# Patient Record
Sex: FEMALE | Race: WHITE | NOT HISPANIC OR LATINO
[De-identification: names, ages, dates, MRNs, and addresses within clinical notes are randomized per-mention and may not be internally consistent; named-entity substitution may affect disease eponyms.]

---

## 2020-10-23 ENCOUNTER — TRANSCRIPTION ENCOUNTER (OUTPATIENT)
Age: 20
End: 2020-10-23

## 2021-02-09 ENCOUNTER — TRANSCRIPTION ENCOUNTER (OUTPATIENT)
Age: 21
End: 2021-02-09

## 2021-09-05 ENCOUNTER — TRANSCRIPTION ENCOUNTER (OUTPATIENT)
Age: 21
End: 2021-09-05

## 2021-10-30 ENCOUNTER — TRANSCRIPTION ENCOUNTER (OUTPATIENT)
Age: 21
End: 2021-10-30

## 2022-11-01 ENCOUNTER — NON-APPOINTMENT (OUTPATIENT)
Age: 22
End: 2022-11-01

## 2023-03-24 PROBLEM — Z00.00 ENCOUNTER FOR PREVENTIVE HEALTH EXAMINATION: Status: ACTIVE | Noted: 2023-03-24

## 2023-03-27 ENCOUNTER — APPOINTMENT (OUTPATIENT)
Dept: MRI IMAGING | Facility: HOSPITAL | Age: 23
End: 2023-03-27

## 2023-09-17 ENCOUNTER — NON-APPOINTMENT (OUTPATIENT)
Age: 23
End: 2023-09-17

## 2023-09-22 ENCOUNTER — NON-APPOINTMENT (OUTPATIENT)
Age: 23
End: 2023-09-22

## 2023-09-24 ENCOUNTER — EMERGENCY (EMERGENCY)
Facility: HOSPITAL | Age: 23
LOS: 1 days | Discharge: HOME | End: 2023-09-24
Attending: STUDENT IN AN ORGANIZED HEALTH CARE EDUCATION/TRAINING PROGRAM
Payer: COMMERCIAL

## 2023-09-24 VITALS
RESPIRATION RATE: 20 BRPM | HEART RATE: 110 BPM | DIASTOLIC BLOOD PRESSURE: 82 MMHG | WEIGHT: 139.99 LBS | OXYGEN SATURATION: 99 % | TEMPERATURE: 99 F | SYSTOLIC BLOOD PRESSURE: 118 MMHG | HEIGHT: 65 IN

## 2023-09-24 LAB
ALBUMIN SERPL ELPH-MCNC: 4 G/DL — SIGNIFICANT CHANGE UP (ref 3.3–5)
ALP SERPL-CCNC: 73 U/L — SIGNIFICANT CHANGE UP (ref 40–120)
ALT FLD-CCNC: 13 U/L — SIGNIFICANT CHANGE UP (ref 10–45)
ANION GAP SERPL CALC-SCNC: 13 MMOL/L — SIGNIFICANT CHANGE UP (ref 5–17)
ANION GAP SERPL CALC-SCNC: 14 MMOL/L — SIGNIFICANT CHANGE UP (ref 5–17)
APTT BLD: 26.1 SEC — SIGNIFICANT CHANGE UP (ref 24.5–35.6)
AST SERPL-CCNC: 50 U/L — HIGH (ref 10–40)
BASOPHILS # BLD AUTO: 0.04 K/UL — SIGNIFICANT CHANGE UP (ref 0–0.2)
BASOPHILS NFR BLD AUTO: 0.3 % — SIGNIFICANT CHANGE UP (ref 0–2)
BILIRUB SERPL-MCNC: 0.5 MG/DL — SIGNIFICANT CHANGE UP (ref 0.2–1.2)
BUN SERPL-MCNC: 10 MG/DL — SIGNIFICANT CHANGE UP (ref 7–23)
BUN SERPL-MCNC: 9 MG/DL — SIGNIFICANT CHANGE UP (ref 7–23)
CALCIUM SERPL-MCNC: 8.6 MG/DL — SIGNIFICANT CHANGE UP (ref 8.4–10.5)
CALCIUM SERPL-MCNC: 9.4 MG/DL — SIGNIFICANT CHANGE UP (ref 8.4–10.5)
CHLORIDE SERPL-SCNC: 102 MMOL/L — SIGNIFICANT CHANGE UP (ref 96–108)
CHLORIDE SERPL-SCNC: 99 MMOL/L — SIGNIFICANT CHANGE UP (ref 96–108)
CO2 SERPL-SCNC: 22 MMOL/L — SIGNIFICANT CHANGE UP (ref 22–31)
CO2 SERPL-SCNC: 22 MMOL/L — SIGNIFICANT CHANGE UP (ref 22–31)
CREAT SERPL-MCNC: 0.58 MG/DL — SIGNIFICANT CHANGE UP (ref 0.5–1.3)
CREAT SERPL-MCNC: 0.62 MG/DL — SIGNIFICANT CHANGE UP (ref 0.5–1.3)
EGFR: 127 ML/MIN/1.73M2 — SIGNIFICANT CHANGE UP
EGFR: 129 ML/MIN/1.73M2 — SIGNIFICANT CHANGE UP
EOSINOPHIL # BLD AUTO: 0.02 K/UL — SIGNIFICANT CHANGE UP (ref 0–0.5)
EOSINOPHIL NFR BLD AUTO: 0.2 % — SIGNIFICANT CHANGE UP (ref 0–6)
GLUCOSE SERPL-MCNC: 86 MG/DL — SIGNIFICANT CHANGE UP (ref 70–99)
GLUCOSE SERPL-MCNC: 86 MG/DL — SIGNIFICANT CHANGE UP (ref 70–99)
HCT VFR BLD CALC: 39 % — SIGNIFICANT CHANGE UP (ref 34.5–45)
HGB BLD-MCNC: 13.3 G/DL — SIGNIFICANT CHANGE UP (ref 11.5–15.5)
IMM GRANULOCYTES NFR BLD AUTO: 1.9 % — HIGH (ref 0–0.9)
INR BLD: 1.13 RATIO — SIGNIFICANT CHANGE UP (ref 0.85–1.18)
LYMPHOCYTES # BLD AUTO: 1.6 K/UL — SIGNIFICANT CHANGE UP (ref 1–3.3)
LYMPHOCYTES # BLD AUTO: 12.5 % — LOW (ref 13–44)
MCHC RBC-ENTMCNC: 30 PG — SIGNIFICANT CHANGE UP (ref 27–34)
MCHC RBC-ENTMCNC: 34.1 GM/DL — SIGNIFICANT CHANGE UP (ref 32–36)
MCV RBC AUTO: 87.8 FL — SIGNIFICANT CHANGE UP (ref 80–100)
MONOCYTES # BLD AUTO: 0.95 K/UL — HIGH (ref 0–0.9)
MONOCYTES NFR BLD AUTO: 7.4 % — SIGNIFICANT CHANGE UP (ref 2–14)
NEUTROPHILS # BLD AUTO: 9.93 K/UL — HIGH (ref 1.8–7.4)
NEUTROPHILS NFR BLD AUTO: 77.7 % — HIGH (ref 43–77)
NRBC # BLD: 0 /100 WBCS — SIGNIFICANT CHANGE UP (ref 0–0)
PLATELET # BLD AUTO: 152 K/UL — SIGNIFICANT CHANGE UP (ref 150–400)
POTASSIUM SERPL-MCNC: 3.8 MMOL/L — SIGNIFICANT CHANGE UP (ref 3.5–5.3)
POTASSIUM SERPL-MCNC: 5.9 MMOL/L — HIGH (ref 3.5–5.3)
POTASSIUM SERPL-SCNC: 3.8 MMOL/L — SIGNIFICANT CHANGE UP (ref 3.5–5.3)
POTASSIUM SERPL-SCNC: 5.9 MMOL/L — HIGH (ref 3.5–5.3)
PROT SERPL-MCNC: 8.2 G/DL — SIGNIFICANT CHANGE UP (ref 6–8.3)
PROTHROM AB SERPL-ACNC: 11.8 SEC — SIGNIFICANT CHANGE UP (ref 9.5–13)
RAPID RVP RESULT: SIGNIFICANT CHANGE UP
RBC # BLD: 4.44 M/UL — SIGNIFICANT CHANGE UP (ref 3.8–5.2)
RBC # FLD: 11.9 % — SIGNIFICANT CHANGE UP (ref 10.3–14.5)
SARS-COV-2 RNA SPEC QL NAA+PROBE: SIGNIFICANT CHANGE UP
SODIUM SERPL-SCNC: 135 MMOL/L — SIGNIFICANT CHANGE UP (ref 135–145)
SODIUM SERPL-SCNC: 137 MMOL/L — SIGNIFICANT CHANGE UP (ref 135–145)
WBC # BLD: 12.78 K/UL — HIGH (ref 3.8–10.5)
WBC # FLD AUTO: 12.78 K/UL — HIGH (ref 3.8–10.5)

## 2023-09-24 PROCEDURE — 70491 CT SOFT TISSUE NECK W/DYE: CPT | Mod: 26,MH

## 2023-09-24 PROCEDURE — 99223 1ST HOSP IP/OBS HIGH 75: CPT

## 2023-09-24 RX ORDER — KETOROLAC TROMETHAMINE 30 MG/ML
15 SYRINGE (ML) INJECTION ONCE
Refills: 0 | Status: DISCONTINUED | OUTPATIENT
Start: 2023-09-24 | End: 2023-09-24

## 2023-09-24 RX ORDER — KETOROLAC TROMETHAMINE 30 MG/ML
15 SYRINGE (ML) INJECTION EVERY 6 HOURS
Refills: 0 | Status: DISCONTINUED | OUTPATIENT
Start: 2023-09-24 | End: 2023-09-24

## 2023-09-24 RX ORDER — DEXAMETHASONE 0.5 MG/5ML
10 ELIXIR ORAL EVERY 8 HOURS
Refills: 0 | Status: DISCONTINUED | OUTPATIENT
Start: 2023-09-24 | End: 2023-09-27

## 2023-09-24 RX ORDER — DEXAMETHASONE 0.5 MG/5ML
6 ELIXIR ORAL EVERY 8 HOURS
Refills: 0 | Status: DISCONTINUED | OUTPATIENT
Start: 2023-09-24 | End: 2023-09-24

## 2023-09-24 RX ORDER — SODIUM CHLORIDE 9 MG/ML
1000 INJECTION INTRAMUSCULAR; INTRAVENOUS; SUBCUTANEOUS ONCE
Refills: 0 | Status: COMPLETED | OUTPATIENT
Start: 2023-09-24 | End: 2023-09-24

## 2023-09-24 RX ORDER — DEXTROAMPHETAMINE SACCHARATE, AMPHETAMINE ASPARTATE, DEXTROAMPHETAMINE SULFATE AND AMPHETAMINE SULFATE 1.875; 1.875; 1.875; 1.875 MG/1; MG/1; MG/1; MG/1
10 TABLET ORAL
Refills: 0 | Status: COMPLETED | OUTPATIENT
Start: 2023-09-24 | End: 2023-10-01

## 2023-09-24 RX ADMIN — Medication 15 MILLIGRAM(S): at 09:47

## 2023-09-24 RX ADMIN — Medication 100 MILLIGRAM(S): at 09:40

## 2023-09-24 RX ADMIN — Medication 15 MILLIGRAM(S): at 11:20

## 2023-09-24 RX ADMIN — Medication 100 MILLIGRAM(S): at 17:46

## 2023-09-24 RX ADMIN — Medication 6 MILLIGRAM(S): at 09:40

## 2023-09-24 RX ADMIN — SODIUM CHLORIDE 1000 MILLILITER(S): 9 INJECTION INTRAMUSCULAR; INTRAVENOUS; SUBCUTANEOUS at 09:40

## 2023-09-24 RX ADMIN — Medication 102 MILLIGRAM(S): at 17:46

## 2023-09-24 NOTE — ED CDU PROVIDER INITIAL DAY NOTE - PROGRESS NOTE DETAILS
pt endorses improvement in symptoms, breathing comfortably and speaking in full sentences, tolerating PO, spoke with Tato from ENT who will see pt. -Park Luevano PA-C CDU PROGRESS NOTE PA IRENE: Received pt at 1900 sign-out. Case/plan reviewed. VSS. On exam, +posterior erythema to pharynx w/ mild swelling left tonsillar region. Uvula midline, no exudates no stridor, muffled voice or drooling. S1 S2 noted, RRR, lungs CTA b/l, BS x4 with soft, nontender abdomen. Pt without complaints. Will continue to monitor overnight.

## 2023-09-24 NOTE — ED ADULT NURSE REASSESSMENT NOTE - NS ED NURSE REASSESS COMMENT FT1
Report received from JESUS Cabrales. Pt received A&Ox4, vitals retaken and documented. Bed locked and in lowest position, side rails raised, call bell within reach. Pt denies any pain or discomfort at this time.

## 2023-09-24 NOTE — ED PROVIDER NOTE - OBJECTIVE STATEMENT
24 y/o female prior hx PTA presenting to the ED for evaluation of severe throat pain for 1 week. reports she went to urgent care 6 days ago, tested negative for strep, started to feel better but over the last 48 hours she has severe left sided neck pain. reports she feels like she is going to gag everytime she tries to speak due to the swelling. does report she is Intermittently spitting into a cup.

## 2023-09-24 NOTE — ED PROVIDER NOTE - ATTENDING APP SHARED VISIT CONTRIBUTION OF CARE
Attending MD JUANITA Gann- This was a shared visit with SELENA.  I have reviewed and discussed the case with the SELENA and agree with verified documentation unless otherwise documented.  I have independently spoken with and examined the patient and my documentation of history/physical exam and MDM are as follows:    23 F with PMH recurrent tonsillitis and prior peritonsillar abscesses presenting for evaluation of 1 week of severe throat pain, fever, pain with swallowing.  On exam, patient no acute distress, noted to have significant swelling of left tonsil with rightward deviation of uvula, airway patent, patient tolerating secretions and speaking clearly in full sentences, no vocal change.  Abdomen soft nontender, no pedal edema.  Heart and lungs clear to auscultation.    MDM–young female with recurrent PTA presenting with sore throat x1 week, exam concerning for PTA.  Will obtain labs and CT neck; treat with antibiotics, fluids, pain control.  Leukocytosis to 12.7, no anemia or thrombocytopenia.  BMP showing hyperkalemia to 5.9 in setting of severe hemolysis; repeat within normal limits.  No other electrolyte abnormalities or evidence of JOSEFINA.  Hepatic panel grossly within normal limits.  COVID-negative.    CT concerning for phlegmon versus early abscess in left tonsil with inflammation extending to the level of epiglottis.  Will consult ENT and place patient in CDU for airway monitoring, pain control, ENT recommendations.

## 2023-09-24 NOTE — ED ADULT NURSE NOTE - OBJECTIVE STATEMENT
24yo F aaox4 , LMP 2 weeks ago , presents to ED from home, as per pt 7 days ago onset a sore throat, worsening on the last few days, pt endorses difficulty swallowing, fever , on examination L tonsil swollen, Pt denies CP, SOB, HA, vision changes, n/v/d, Safety and comfort measures initiated- bed placed in lowest position and side rails raised. Pt oriented to call bell system.

## 2023-09-24 NOTE — ED ADULT NURSE REASSESSMENT NOTE - NS ED NURSE REASSESS COMMENT FT1
14.30 Received the Pt from  JESUS Martinez Pt is Observed for throat pain /PTA. Received the Pt A&OX 4 obeys commands Flavia N/V/D fever chills cp SOB .Comfort care & safety measures continued  IV site looks clean & dry no signs of infiltration noted pt denies  pain IV site .  Pt is advised to call for help  call bell with in the reach pt verbalized the understanding .  pending CDU  MD tucker . GCS 15/15 A&OX 4 PERRLA  size 3 Strong upper & lower extremities steady gait   No facial droop  No Hand Leg drop denies numbness tingling  Pt states she feels better  speaks in clear sentences able to swallow well Continue to monitor

## 2023-09-24 NOTE — ED CDU PROVIDER DISPOSITION NOTE - NSFOLLOWUPCLINICS_GEN_ALL_ED_FT
[facility] - ENT  Otolaryngology (ENT)  [address] [place] [address]  Phone: [phone]  Fax:

## 2023-09-24 NOTE — ED CDU PROVIDER DISPOSITION NOTE - ATTENDING APP SHARED VISIT CONTRIBUTION OF CARE
RGUJRAL 23-year-old female history of PTA 3-1/2 years ago presented with sore throat and found to have left palatien phlegmon with early abscess signs.  Patient was treated with Decadron IV antibiotics and states she feels much better.  Patient's labs reviewed with slight increase in white count likely related to steroids.  Patient states she is tolerating oral no difficulty breathing.  On exam oropharynx with mild erythema airway patent.  Patient requesting discharge was evaluated by ENT.  Discussed discharge plan to continue antibiotics follow-up with ENT and strict return precautions.

## 2023-09-24 NOTE — ED CDU PROVIDER DISPOSITION NOTE - CLINICAL COURSE
22 y/o female prior hx PTA presenting to the ED for evaluation of severe throat pain for 1 week. reports she went to urgent care 6 days ago, tested negative for strep, started to feel better but over the last 48 hours she has severe left sided neck pain. reports she feels like she is going to gag every time she tries to speak due to the swelling. does report she is Intermittently spitting into a cup.     In ED, wbc 12.78, CT H/N showing early abscess of the L palatine tonsil, sent to CDU for IV abx, pain control and ENT evaluation. 22 y/o female prior hx PTA presenting to the ED for evaluation of severe throat pain for 1 week. reports she went to urgent care 6 days ago, tested negative for strep, started to feel better but over the last 48 hours she has severe left sided neck pain. reports she feels like she is going to gag every time she tries to speak due to the swelling. does report she is Intermittently spitting into a cup.     In ED, wbc 12.78, CT H/N showing early abscess of the L palatine tonsil, sent to CDU for IV abx, pain control and ENT evaluation.  In CDU, Patient reassessed, resting comfortably in bed.  Reports significant proved and symptoms.  VSS.  Tolerating p.o. without difficulty.  I touched base with ENT this morning who recommends outpatient course of clindamycin for 10 days as well as a Medrol Dosepak.  All results discharge instructions and return precautions given to patient.

## 2023-09-24 NOTE — ED CDU PROVIDER DISPOSITION NOTE - PATIENT PORTAL LINK FT
You can access the FollowMyHealth Patient Portal offered by [facility] by registering at the following website: [email or web address]. By joining [employer]’s FollowMyHealth portal, you will also be able to view your health information using other applications (apps) compatible with our system.

## 2023-09-24 NOTE — ED CDU PROVIDER INITIAL DAY NOTE - ATTENDING APP SHARED VISIT CONTRIBUTION OF CARE
Attending MD JUANITA Patricio I have personally performed a face to face diagnostic evaluation on this patient.  I have reviewed the ACP note and agree with the history, exam, and plan of care, except as noted. My exam and MDM are as follows:    23 F with PMH recurrent tonsillitis and prior peritonsillar abscesses presenting for evaluation of 1 week of severe throat pain, fever, pain with swallowing.  On exam, patient no acute distress, noted to have significant swelling of left tonsil with rightward deviation of uvula, airway patent, patient tolerating secretions and speaking clearly in full sentences, no vocal change.  Abdomen soft nontender, no pedal edema.  Heart and lungs clear to auscultation.    MDM–young female with recurrent PTA presenting with sore throat x1 week, exam concerning for PTA.  Will obtain labs and CT neck; treat with antibiotics, fluids, pain control.  Leukocytosis to 12.7, no anemia or thrombocytopenia.  BMP showing hyperkalemia to 5.9 in setting of severe hemolysis; repeat within normal limits.  No other electrolyte abnormalities or evidence of JOSEFINA.  Hepatic panel grossly within normal limits.  COVID-negative.    CT concerning for phlegmon versus early abscess in left tonsil with inflammation extending to the level of epiglottis.  Will consult ENT and place patient in CDU for airway monitoring, pain control, ENT recommendations.

## 2023-09-24 NOTE — ED CDU PROVIDER DISPOSITION NOTE - NSFOLLOWUPINSTRUCTIONS_ED_ALL_ED_FT
- stay hydrated.   -take all home medications as prescribed  - take tylenol 975mg and ibuprofen 600mg every 6 hours as needed for pain-take with meals.  -take clindamycin as prescribed    - follow up with ENT_______________    - return if symptoms worsen, fever, worsening pain, difficulty swallowing, difficulty breathing, if you are unable to swallow and all other concerns.    Peritonsillar Abscess  An open mouth showing the tonsils.  A peritonsillar abscess is a collection of pus in the back of the throat, behind the tonsils. It usually occurs when an infection of the throat or tonsils (tonsillitis) spreads into the tissues around the tonsils.    What are the causes?  The infection that leads to a peritonsillar abscess is usually caused by streptococcal bacteria.    What increases the risk?  You are more likely to develop this condition if:  You have recently been diagnosed with an infection in your mouth or throat.  You smoke.  You have gum disease or gingivitis (periodontal disease).  What are the signs or symptoms?  Early symptoms of this condition include:  Fever and chills.  A sore throat, often with pain on just one side.  Swollen, tender glands (lymph nodes) in the neck.  Headache.  As the infection gets worse, symptoms may include:  Difficulty swallowing.  Drooling because of difficulty swallowing saliva.  Difficulty opening your mouth.  Bad breath.  Changes in how the voice sounds.  How is this diagnosed?  This condition may be diagnosed based on:  Your symptoms and medical history.  A physical exam.  Imaging tests, such as ultrasound or CT scan.  Testing a pus sample from the abscess. Your health care provider may collect a pus sample by swabbing the back of your throat or by removing some pus with a syringe and needle (needle aspiration).  How is this treated?  Treatment usually involves draining the pus from the abscess. This may be done through needle aspiration or by making an incision in the abscess and draining the fluid. You will also likely need to take antibiotic medicine.    Follow these instructions at home:  Medicines    Take over-the-counter and prescription medicines only as told by your health care provider.  If you were prescribed an antibiotic, take it as told by your health care provider. Do not stop taking the antibiotic even if you start to feel better.  Eating and drinking    A diet of soft foods, including apple sauce, yogurt, and a smoothie.   Drink enough fluid to keep your urine pale yellow.  While your throat is sore, try only drinking liquids or eating only soft-textured foods such as yogurt and ice cream.  Activity    Rest as told by your health care provider.  Return to your normal activities as told by your health care provider. Ask your health care provider what activities are safe for you.  General Instructions    If your abscess was drained, gargle with a mixture of salt and water 3–4 times a day or as needed.  To make salt water, completely dissolve ½–1 tsp (3–6 g) of salt in 1 cup (237 mL) of warm water.  Do not swallow this mixture.  Do not use any products that contain nicotine or tobacco. These products include cigarettes, chewing tobacco, and vaping devices, such as e-cigarettes. If you need help quitting, ask your health care provider.  Keep all follow-up visits. This is important.  Contact a health care provider if:  You have more pain, swelling, redness, or pus in your throat.  You have a headache.  You have a lack of energy (lethargy) or feel generally sick.  You have a fever or chills.  You have trouble swallowing or eating.  You have signs of dehydration, such as:  Light-headedness or dizziness when standing.  Urinating less than usual.  A fast heart rate.  Dry mouth.  Get help right away if:  You are unable to swallow.  You have trouble breathing, or it is easier for you to breathe when you lean forward.  You cough up blood or vomit blood after treatment.  You have severe throat pain that does not get better with medicine.  These symptoms may represent a serious problem that is an emergency. Do not wait to see if the symptoms will go away. Get medical help right away. Call your local emergency services (911 in the [place] Do not drive yourself to the hospital.    Summary  A peritonsillar abscess is a collection of pus in the back of the throat. It usually occurs when an infection of the throat or tonsils spreads to surrounding tissues.  Symptoms include a sore throat, difficulty swallowing, fever, chills, and occasional drooling.  This condition is treated by draining the abscess and taking antibiotic medicine.  Call your health care provider if you have trouble swallowing or eating after treatment.  Get help right away if you vomit blood or cough up blood after treatment. - stay hydrated.   -take all home medications as prescribed  - take tylenol 975mg and ibuprofen 600mg every 6 hours as needed for pain-take with meals.  -take clindamycin as prescribed    - follow up with ENT within 1 week    [facility] - ENT  Otolaryngology (ENT)  [address] [place] [address]  Phone: [phone]    - return if symptoms worsen, fever, worsening pain, difficulty swallowing, difficulty breathing, if you are unable to swallow and all other concerns.    Peritonsillar Abscess  An open mouth showing the tonsils.  A peritonsillar abscess is a collection of pus in the back of the throat, behind the tonsils. It usually occurs when an infection of the throat or tonsils (tonsillitis) spreads into the tissues around the tonsils.    What are the causes?  The infection that leads to a peritonsillar abscess is usually caused by streptococcal bacteria.    What increases the risk?  You are more likely to develop this condition if:  You have recently been diagnosed with an infection in your mouth or throat.  You smoke.  You have gum disease or gingivitis (periodontal disease).  What are the signs or symptoms?  Early symptoms of this condition include:  Fever and chills.  A sore throat, often with pain on just one side.  Swollen, tender glands (lymph nodes) in the neck.  Headache.  As the infection gets worse, symptoms may include:  Difficulty swallowing.  Drooling because of difficulty swallowing saliva.  Difficulty opening your mouth.  Bad breath.  Changes in how the voice sounds.  How is this diagnosed?  This condition may be diagnosed based on:  Your symptoms and medical history.  A physical exam.  Imaging tests, such as ultrasound or CT scan.  Testing a pus sample from the abscess. Your health care provider may collect a pus sample by swabbing the back of your throat or by removing some pus with a syringe and needle (needle aspiration).  How is this treated?  Treatment usually involves draining the pus from the abscess. This may be done through needle aspiration or by making an incision in the abscess and draining the fluid. You will also likely need to take antibiotic medicine.    Follow these instructions at home:  Medicines    Take over-the-counter and prescription medicines only as told by your health care provider.  If you were prescribed an antibiotic, take it as told by your health care provider. Do not stop taking the antibiotic even if you start to feel better.  Eating and drinking    A diet of soft foods, including apple sauce, yogurt, and a smoothie.   Drink enough fluid to keep your urine pale yellow.  While your throat is sore, try only drinking liquids or eating only soft-textured foods such as yogurt and ice cream.  Activity    Rest as told by your health care provider.  Return to your normal activities as told by your health care provider. Ask your health care provider what activities are safe for you.  General Instructions    If your abscess was drained, gargle with a mixture of salt and water 3–4 times a day or as needed.  To make salt water, completely dissolve ½–1 tsp (3–6 g) of salt in 1 cup (237 mL) of warm water.  Do not swallow this mixture.  Do not use any products that contain nicotine or tobacco. These products include cigarettes, chewing tobacco, and vaping devices, such as e-cigarettes. If you need help quitting, ask your health care provider.  Keep all follow-up visits. This is important.  Contact a health care provider if:  You have more pain, swelling, redness, or pus in your throat.  You have a headache.  You have a lack of energy (lethargy) or feel generally sick.  You have a fever or chills.  You have trouble swallowing or eating.  You have signs of dehydration, such as:  Light-headedness or dizziness when standing.  Urinating less than usual.  A fast heart rate.  Dry mouth.  Get help right away if:  You are unable to swallow.  You have trouble breathing, or it is easier for you to breathe when you lean forward.  You cough up blood or vomit blood after treatment.  You have severe throat pain that does not get better with medicine.  These symptoms may represent a serious problem that is an emergency. Do not wait to see if the symptoms will go away. Get medical help right away. Call your local emergency services (911 in the [place] Do not drive yourself to the hospital.    Summary  A peritonsillar abscess is a collection of pus in the back of the throat. It usually occurs when an infection of the throat or tonsils spreads to surrounding tissues.  Symptoms include a sore throat, difficulty swallowing, fever, chills, and occasional drooling.  This condition is treated by draining the abscess and taking antibiotic medicine.  Call your health care provider if you have trouble swallowing or eating after treatment.  Get help right away if you vomit blood or cough up blood after treatment.

## 2023-09-24 NOTE — ED ADULT NURSE NOTE - ED STAT RN HANDOFF DETAILS
Report given to gustavo LEE. patient is in no acute distress. Patient vital signs stable, plan of care explained.

## 2023-09-24 NOTE — ED CDU PROVIDER INITIAL DAY NOTE - OBJECTIVE STATEMENT
22 y/o female prior hx PTA presenting to the ED for evaluation of severe throat pain for 1 week. reports she went to urgent care 6 days ago, tested negative for strep, started to feel better but over the last 48 hours she has severe left sided neck pain. reports she feels like she is going to gag every time she tries to speak due to the swelling. does report she is Intermittently spitting into a cup.     In ED, wbc 12.78, CT H/N showing early abscess of the L palatine tonsil, sent to CDU for IV abx, pain control and ENT evaluation. 24 y/o female prior hx PTA presenting to the ED for evaluation of severe throat pain for 1 week. reports she went to urgent care 6 days ago, tested negative for strep, started to feel better but over the last 48 hours she has severe left sided neck pain. reports she feels like she is going to gag every time she tries to speak due to the swelling. does report she is Intermittently spitting into a cup.     In ED, wbc 12.78, CT H/N showing early abscess of the L palatine tonsil, noted to have improvement in her symptoms, sent to CDU for IV abx, pain control and ENT evaluation.

## 2023-09-25 VITALS
DIASTOLIC BLOOD PRESSURE: 68 MMHG | SYSTOLIC BLOOD PRESSURE: 103 MMHG | RESPIRATION RATE: 18 BRPM | TEMPERATURE: 99 F | OXYGEN SATURATION: 99 % | HEART RATE: 67 BPM

## 2023-09-25 LAB
BASOPHILS # BLD AUTO: 0.02 K/UL — SIGNIFICANT CHANGE UP (ref 0–0.2)
BASOPHILS NFR BLD AUTO: 0.1 % — SIGNIFICANT CHANGE UP (ref 0–2)
EOSINOPHIL # BLD AUTO: 0 K/UL — SIGNIFICANT CHANGE UP (ref 0–0.5)
EOSINOPHIL NFR BLD AUTO: 0 % — SIGNIFICANT CHANGE UP (ref 0–6)
HCT VFR BLD CALC: 40.2 % — SIGNIFICANT CHANGE UP (ref 34.5–45)
HGB BLD-MCNC: 13.9 G/DL — SIGNIFICANT CHANGE UP (ref 11.5–15.5)
IMM GRANULOCYTES NFR BLD AUTO: 0.9 % — SIGNIFICANT CHANGE UP (ref 0–0.9)
LYMPHOCYTES # BLD AUTO: 1.45 K/UL — SIGNIFICANT CHANGE UP (ref 1–3.3)
LYMPHOCYTES # BLD AUTO: 9.9 % — LOW (ref 13–44)
MCHC RBC-ENTMCNC: 29.8 PG — SIGNIFICANT CHANGE UP (ref 27–34)
MCHC RBC-ENTMCNC: 34.6 GM/DL — SIGNIFICANT CHANGE UP (ref 32–36)
MCV RBC AUTO: 86.1 FL — SIGNIFICANT CHANGE UP (ref 80–100)
MONOCYTES # BLD AUTO: 0.27 K/UL — SIGNIFICANT CHANGE UP (ref 0–0.9)
MONOCYTES NFR BLD AUTO: 1.8 % — LOW (ref 2–14)
NEUTROPHILS # BLD AUTO: 12.79 K/UL — HIGH (ref 1.8–7.4)
NEUTROPHILS NFR BLD AUTO: 87.3 % — HIGH (ref 43–77)
NRBC # BLD: 0 /100 WBCS — SIGNIFICANT CHANGE UP (ref 0–0)
PLATELET # BLD AUTO: 165 K/UL — SIGNIFICANT CHANGE UP (ref 150–400)
RBC # BLD: 4.67 M/UL — SIGNIFICANT CHANGE UP (ref 3.8–5.2)
RBC # FLD: 11.8 % — SIGNIFICANT CHANGE UP (ref 10.3–14.5)
WBC # BLD: 14.66 K/UL — HIGH (ref 3.8–10.5)
WBC # FLD AUTO: 14.66 K/UL — HIGH (ref 3.8–10.5)

## 2023-09-25 PROCEDURE — 80053 COMPREHEN METABOLIC PANEL: CPT

## 2023-09-25 PROCEDURE — 0225U NFCT DS DNA&RNA 21 SARSCOV2: CPT

## 2023-09-25 PROCEDURE — 85025 COMPLETE CBC W/AUTO DIFF WBC: CPT

## 2023-09-25 PROCEDURE — 70491 CT SOFT TISSUE NECK W/DYE: CPT | Mod: MH

## 2023-09-25 PROCEDURE — G0378: CPT

## 2023-09-25 PROCEDURE — 99239 HOSP IP/OBS DSCHRG MGMT >30: CPT

## 2023-09-25 PROCEDURE — 84702 CHORIONIC GONADOTROPIN TEST: CPT

## 2023-09-25 PROCEDURE — 85730 THROMBOPLASTIN TIME PARTIAL: CPT

## 2023-09-25 PROCEDURE — 80048 BASIC METABOLIC PNL TOTAL CA: CPT

## 2023-09-25 PROCEDURE — 31575 DIAGNOSTIC LARYNGOSCOPY: CPT

## 2023-09-25 PROCEDURE — 99284 EMERGENCY DEPT VISIT MOD MDM: CPT | Mod: 25

## 2023-09-25 PROCEDURE — 96375 TX/PRO/DX INJ NEW DRUG ADDON: CPT | Mod: XU

## 2023-09-25 PROCEDURE — 85610 PROTHROMBIN TIME: CPT

## 2023-09-25 PROCEDURE — 96374 THER/PROPH/DIAG INJ IV PUSH: CPT | Mod: XU

## 2023-09-25 PROCEDURE — 96376 TX/PRO/DX INJ SAME DRUG ADON: CPT | Mod: XU

## 2023-09-25 PROCEDURE — 36415 COLL VENOUS BLD VENIPUNCTURE: CPT

## 2023-09-25 RX ADMIN — Medication 102 MILLIGRAM(S): at 01:05

## 2023-09-25 RX ADMIN — DEXTROAMPHETAMINE SACCHARATE, AMPHETAMINE ASPARTATE, DEXTROAMPHETAMINE SULFATE AND AMPHETAMINE SULFATE 10 MILLIGRAM(S): 1.875; 1.875; 1.875; 1.875 TABLET ORAL at 09:10

## 2023-09-25 RX ADMIN — Medication 100 MILLIGRAM(S): at 02:00

## 2023-09-25 NOTE — ED CDU PROVIDER SUBSEQUENT DAY NOTE - NS ED ATTENDING STATEMENT MOD
Patient feels relief after zofran administration. This was a shared visit with the SELENA. I reviewed and verified the documentation and independently performed the documented:

## 2023-09-25 NOTE — ED CDU PROVIDER SUBSEQUENT DAY NOTE - THROAT FINDINGS
+posterior erythema to pharynx w/ mild swelling left peritonsillar region. Uvula midline, no exudates no stridor, muffled voice or drooling.

## 2023-09-25 NOTE — CONSULT NOTE ADULT - COMMENTS
Vital Signs Last 24 Hrs  T(C): 36.7 (24 Sep 2023 20:02), Max: 37.4 (24 Sep 2023 08:49)  T(F): 98.1 (24 Sep 2023 20:02), Max: 99.3 (24 Sep 2023 08:49)  HR: 97 (24 Sep 2023 20:02) (90 - 118)  BP: 115/77 (24 Sep 2023 20:02) (115/77 - 126/66)  BP(mean): 88 (24 Sep 2023 20:02) (88 - 88)  RR: 16 (24 Sep 2023 20:02) (16 - 20)  SpO2: 98% (24 Sep 2023 20:02) (98% - 100%)    Parameters below as of 24 Sep 2023 20:02  Patient On (Oxygen Delivery Method): room air

## 2023-09-25 NOTE — CONSULT NOTE ADULT - SUBJECTIVE AND OBJECTIVE BOX
HPI: The patient is a 23 year old female with a past medical history significant for PTA presenting to the ED for evaluation of severe throat pain for 1 week. Patient reports she went to urgent care 6 days ago, tested negative for strep, started to feel better but over the last 48 hours she has severe left sided neck pain. reports she feels like she is going to gag everytime she tries to speak due to the swelling. does report she is Intermittently spitting into a cup.    PAST MEDICAL/SURGICAL/FAMILY/SOCIAL HISTORY:    Past Medical, Past Surgical, and Family History:  PAST MEDICAL HISTORY:  Recurrent acute tonsillitis.     Tobacco Usage:  · Tobacco Usage	Unknown if ever smoked    ALLERGIES AND HOME MEDICATIONS:   Allergies:        Allergies:  	amoxicillin: Drug, Hives  	cephalosporins: Drug Category, Hives    Home Medications:   * Outpatient Medication Status not yet specified      LABS:  CBC Full  -  ( 24 Sep 2023 09:41 )  WBC Count : 12.78 K/uL  Hemoglobin : 13.3 g/dL  Hematocrit : 39.0 %  Platelet Count - Automated : 152 K/uL  Mean Cell Volume : 87.8 fl  Mean Cell Hemoglobin : 30.0 pg  Mean Cell Hemoglobin Concentration : 34.1 gm/dL  Auto Neutrophil # : 9.93 K/uL  Auto Lymphocyte # : 1.60 K/uL  Auto Monocyte # : 0.95 K/uL  Auto Eosinophil # : 0.02 K/uL  Auto Basophil # : 0.04 K/uL  Auto Neutrophil % : 77.7 %  Auto Lymphocyte % : 12.5 %  Auto Monocyte % : 7.4 %  Auto Eosinophil % : 0.2 %  Auto Basophil % : 0.3 %

## 2023-09-25 NOTE — ED CDU PROVIDER SUBSEQUENT DAY NOTE - PROGRESS NOTE DETAILS
Patient reassessed, resting comfortably in bed.  Reports significant proved and symptoms.  VSS.  Tolerating p.o. without difficulty.  I touched base with ENT this morning who recommends outpatient course of clindamycin for 10 days as well as a Medrol Dosepak.  All results discharge instructions and return precautions given to patient.

## 2023-09-25 NOTE — ED CDU PROVIDER SUBSEQUENT DAY NOTE - HISTORY
CDU PROGRESS NOTE PA IRENE: Pt resting comfortably, without complaint. VSS, Exam +posterior erythema to pharynx w/ mild swelling left peritonsillar region. Uvula midline, no exudates no stridor, muffled voice or drooling. ENT recs appreciated, will continue IV abx and steroids.

## 2023-09-25 NOTE — CONSULT NOTE ADULT - ASSESSMENT
Assessment:  The patient is a 23 year old female with a past medical history significant for PTA presenting to the ED for evaluation of severe throat pain for 1 week. Exam consistent with early peritonsillar phlegmon. Airway is widely patent    Plan:  1) Clindamycin IV  2) decadron 10mg IV q8hrs x 3 doses  3) CDU  4) diet as tolerated  5) ok to d/c home on 09/25/23 with PO clinda x 7 days  6) f/u in ENT clinic [phone]

## 2024-01-01 ENCOUNTER — NON-APPOINTMENT (OUTPATIENT)
Age: 24
End: 2024-01-01

## 2024-11-18 ENCOUNTER — EMERGENCY (EMERGENCY)
Facility: HOSPITAL | Age: 24
LOS: 1 days | Discharge: HOME | End: 2024-11-18
Attending: EMERGENCY MEDICINE
Payer: COMMERCIAL

## 2024-11-18 VITALS
WEIGHT: 136.69 LBS | SYSTOLIC BLOOD PRESSURE: 107 MMHG | DIASTOLIC BLOOD PRESSURE: 74 MMHG | HEIGHT: 65 IN | OXYGEN SATURATION: 98 % | TEMPERATURE: 98 F | HEART RATE: 89 BPM | RESPIRATION RATE: 18 BRPM

## 2024-11-18 PROBLEM — J03.91 ACUTE RECURRENT TONSILLITIS, UNSPECIFIED: Chronic | Status: ACTIVE | Noted: 2023-09-24

## 2024-11-18 LAB
ALBUMIN SERPL ELPH-MCNC: 3.8 G/DL — SIGNIFICANT CHANGE UP (ref 3.3–5.2)
ALP SERPL-CCNC: 56 U/L — SIGNIFICANT CHANGE UP (ref 40–120)
ALT FLD-CCNC: 14 U/L — SIGNIFICANT CHANGE UP
ANION GAP SERPL CALC-SCNC: 11 MMOL/L — SIGNIFICANT CHANGE UP (ref 5–17)
AST SERPL-CCNC: 15 U/L — SIGNIFICANT CHANGE UP
BASOPHILS # BLD AUTO: 0.13 K/UL — SIGNIFICANT CHANGE UP (ref 0–0.2)
BASOPHILS NFR BLD AUTO: 1.7 % — SIGNIFICANT CHANGE UP (ref 0–2)
BILIRUB SERPL-MCNC: 0.3 MG/DL — LOW (ref 0.4–2)
BUN SERPL-MCNC: 18.7 MG/DL — SIGNIFICANT CHANGE UP (ref 8–20)
CALCIUM SERPL-MCNC: 9 MG/DL — SIGNIFICANT CHANGE UP (ref 8.4–10.5)
CHLORIDE SERPL-SCNC: 102 MMOL/L — SIGNIFICANT CHANGE UP (ref 96–108)
CO2 SERPL-SCNC: 27 MMOL/L — SIGNIFICANT CHANGE UP (ref 22–29)
CREAT SERPL-MCNC: 0.71 MG/DL — SIGNIFICANT CHANGE UP (ref 0.5–1.3)
EGFR: 120 ML/MIN/1.73M2 — SIGNIFICANT CHANGE UP
EOSINOPHIL # BLD AUTO: 0 K/UL — SIGNIFICANT CHANGE UP (ref 0–0.5)
EOSINOPHIL NFR BLD AUTO: 0 % — SIGNIFICANT CHANGE UP (ref 0–6)
GIANT PLATELETS BLD QL SMEAR: PRESENT — SIGNIFICANT CHANGE UP
GLUCOSE SERPL-MCNC: 73 MG/DL — SIGNIFICANT CHANGE UP (ref 70–99)
HCG SERPL-ACNC: <4 MIU/ML — SIGNIFICANT CHANGE UP
HCT VFR BLD CALC: 39.3 % — SIGNIFICANT CHANGE UP (ref 34.5–45)
HGB BLD-MCNC: 13 G/DL — SIGNIFICANT CHANGE UP (ref 11.5–15.5)
LYMPHOCYTES # BLD AUTO: 0.92 K/UL — LOW (ref 1–3.3)
LYMPHOCYTES # BLD AUTO: 12.2 % — LOW (ref 13–44)
MANUAL SMEAR VERIFICATION: SIGNIFICANT CHANGE UP
MCHC RBC-ENTMCNC: 29 PG — SIGNIFICANT CHANGE UP (ref 27–34)
MCHC RBC-ENTMCNC: 33.1 G/DL — SIGNIFICANT CHANGE UP (ref 32–36)
MCV RBC AUTO: 87.7 FL — SIGNIFICANT CHANGE UP (ref 80–100)
MONOCYTES # BLD AUTO: 0.33 K/UL — SIGNIFICANT CHANGE UP (ref 0–0.9)
MONOCYTES NFR BLD AUTO: 4.4 % — SIGNIFICANT CHANGE UP (ref 2–14)
NEUTROPHILS # BLD AUTO: 5.85 K/UL — SIGNIFICANT CHANGE UP (ref 1.8–7.4)
NEUTROPHILS NFR BLD AUTO: 77.4 % — HIGH (ref 43–77)
PLAT MORPH BLD: NORMAL — SIGNIFICANT CHANGE UP
PLATELET # BLD AUTO: 54 K/UL — LOW (ref 150–400)
POTASSIUM SERPL-MCNC: 3.8 MMOL/L — SIGNIFICANT CHANGE UP (ref 3.5–5.3)
POTASSIUM SERPL-SCNC: 3.8 MMOL/L — SIGNIFICANT CHANGE UP (ref 3.5–5.3)
PROT SERPL-MCNC: 7 G/DL — SIGNIFICANT CHANGE UP (ref 6.6–8.7)
RBC # BLD: 4.48 M/UL — SIGNIFICANT CHANGE UP (ref 3.8–5.2)
RBC # FLD: 12.3 % — SIGNIFICANT CHANGE UP (ref 10.3–14.5)
RBC BLD AUTO: NORMAL — SIGNIFICANT CHANGE UP
SODIUM SERPL-SCNC: 140 MMOL/L — SIGNIFICANT CHANGE UP (ref 135–145)
VARIANT LYMPHS # BLD: 4.3 % — SIGNIFICANT CHANGE UP (ref 0–6)
WBC # BLD: 7.56 K/UL — SIGNIFICANT CHANGE UP (ref 3.8–10.5)
WBC # FLD AUTO: 7.56 K/UL — SIGNIFICANT CHANGE UP (ref 3.8–10.5)

## 2024-11-18 PROCEDURE — 84702 CHORIONIC GONADOTROPIN TEST: CPT

## 2024-11-18 PROCEDURE — 36415 COLL VENOUS BLD VENIPUNCTURE: CPT

## 2024-11-18 PROCEDURE — 85025 COMPLETE CBC W/AUTO DIFF WBC: CPT

## 2024-11-18 PROCEDURE — 99283 EMERGENCY DEPT VISIT LOW MDM: CPT | Mod: 25

## 2024-11-18 PROCEDURE — 80053 COMPREHEN METABOLIC PANEL: CPT

## 2024-11-18 PROCEDURE — 99284 EMERGENCY DEPT VISIT MOD MDM: CPT

## 2024-11-18 PROCEDURE — 93010 ELECTROCARDIOGRAM REPORT: CPT

## 2024-11-18 PROCEDURE — 93005 ELECTROCARDIOGRAM TRACING: CPT

## 2024-11-18 RX ORDER — SODIUM CHLORIDE 9 MG/ML
1000 INJECTION, SOLUTION INTRAMUSCULAR; INTRAVENOUS; SUBCUTANEOUS ONCE
Refills: 0 | Status: COMPLETED | OUTPATIENT
Start: 2024-11-18 | End: 2024-11-18

## 2024-11-18 RX ADMIN — SODIUM CHLORIDE 1000 MILLILITER(S): 9 INJECTION, SOLUTION INTRAMUSCULAR; INTRAVENOUS; SUBCUTANEOUS at 13:37

## 2024-11-18 NOTE — ED PROVIDER NOTE - OBJECTIVE STATEMENT
23 y/o F c/o dizziness.  patient states that she was in a patient's room and became lightheaded and nauseas for a few minutes.  patient feeling better now.  Denies chest pain or shortness of breath.

## 2024-11-18 NOTE — ED PROVIDER NOTE - ATTENDING APP SHARED VISIT CONTRIBUTION OF CARE
25 y/o F c/o lightheadedness,   Well appearing  now resolved   Will check ekg, labs, fluids    Impression: Lightheadedness      I, Stanley Ruff, performed the initial face to face bedside interview with this patient regarding history of present illness, review of symptoms and relevant past medical, social and family history.  I completed an independent physical examination.  I was the initial provider who evaluated this patient. I have signed out the follow up of any pending tests (i.e. labs, radiological studies) to the ACP.  I have communicated the patient’s plan of care and disposition with the ACP.